# Patient Record
(demographics unavailable — no encounter records)

---

## 2025-01-30 NOTE — HISTORY OF PRESENT ILLNESS
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   Patient was diagnosed with sleep apnea many years ago, in the beginning tried CPAP but was not successful.  Eventually had a inspire device placed for sleep apnea in 2019.  He is currently using inspire device for 3 hrs a night. Part of the reason is insomnia, he has difficulty falling asleep and difficulty staying asleep. Patient drinks 1-2 cups of coffee during the daytime.   He takes ambien cr 12.5 mg, gabapentin 300 mg and thc 2.25g. , he usally falls asleep while watching video on his phone and forgets to turn on the inspire. His sleep is still disturbed with these meds. He gets into bed around midnight has to wake up at 7 am. Patient also tried rozerem 8 mg which did not work.  Currently on 0-0 1.5 v he failed +-+ configuration. good tongue motion noted on this setting. AHI was 0 in lateral position at 1.5v

## 2025-01-30 NOTE — ASSESSMENT
[FreeTextEntry1] : Assessment: 45 yr old complicated case of sleep apnea and insomnia. Still struggling with management of the conditions.  Obstructive Sleep Apnea (RICHI): Patient has a history of sleep apnea with a current successful setting of the Inspire device showing an AHI of 0 in the lateral position. While the patient's sleep apnea is well-controlled, adherence to the Inspire device remains suboptimal due to ongoing insomnia.  Insomnia: Chronic insomnia remains a significant issue, with difficulty falling asleep and staying asleep. His use of Ambien CR, gabapentin, and THC, while providing partial relief, is not sufficient to address his symptoms effectively. There is concern about the long-term side effects of Ambien, which may impair sleep quality and worsen insomnia over time.  Medication-related Sleep Disruption: Current medications (Ambien CR, gabapentin, THC) may be contributing to disturbed sleep patterns and cognitive fog. The patient's dependence on these medications should be addressed.  Plan:  Encourage Increased Use of Inspire Device:  Advise the patient to increase usage of the Inspire device to a full 7 hours per night. Discuss setting reminders and strategies to improve adherence (e.g., phone alarm to remind to turn on device). Continue current Inspire settings at 1.5v if there is no deterioration in effectiveness. Asked to get positoinal device which can help him to stay in lateral position. Ambien CR Reduction:  Encourage tapering off Ambien CR due to long-term side effects. Given his difficulty with discontinuing Ambien in the past, gradual tapering may be necessary.  Medication Adjustment:  Continue gabapentin 300 mg as it may help with sleep onset, but monitor for efficacy and side effects. Recommend reducing THC use or discontinuing if it is not contributing positively to sleep quality.  The patient was educated on the importance of increasing Inspire device usage and the potential long-term benefits of discontinuing Ambien for sleep. The patient was also educated on the potential risks of using THC for sleep and encouraged to consider reducing or discontinuing it.